# Patient Record
Sex: FEMALE | URBAN - METROPOLITAN AREA
[De-identification: names, ages, dates, MRNs, and addresses within clinical notes are randomized per-mention and may not be internally consistent; named-entity substitution may affect disease eponyms.]

---

## 2021-06-14 ENCOUNTER — OFFICE VISIT (OUTPATIENT)
Dept: URBAN - METROPOLITAN AREA CLINIC 35 | Facility: CLINIC | Age: 56
End: 2021-06-14

## 2021-06-14 ENCOUNTER — DASHBOARD ENCOUNTERS (OUTPATIENT)
Age: 56
End: 2021-06-14

## 2021-06-14 VITALS
SYSTOLIC BLOOD PRESSURE: 128 MMHG | BODY MASS INDEX: 32.89 KG/M2 | TEMPERATURE: 97.6 F | HEART RATE: 86 BPM | OXYGEN SATURATION: 97 % | WEIGHT: 217 LBS | HEIGHT: 68 IN | DIASTOLIC BLOOD PRESSURE: 80 MMHG

## 2021-06-14 RX ORDER — SODIUM SULFATE, MAGNESIUM SULFATE, AND POTASSIUM CHLORIDE 17.75; 2.7; 2.25 G/1; G/1; G/1
12 TABLETS AS DIRECTED TABLET ORAL
Qty: 1 KIT | Refills: 0 | OUTPATIENT
Start: 2021-06-14

## 2021-06-14 RX ORDER — PRAVASTATIN SODIUM 20 MG/1
1 TABLET TABLET ORAL ONCE A DAY
Qty: 30 | Status: ACTIVE | COMMUNITY

## 2021-06-14 NOTE — HPI-MIGRATED HPI
;     Colorectal Cancer Screening : 56 year old  female who presents today for a consultation for a colorectal cancer screening. She had her first colonoscopy approximately 10 years ago with Dr. Parham.  No polyps were found.  She currently admits to having 1 bowel movement per day sometimes she has one every 1 to 2 days. Stools are formed and soft without blood, mucus or melena.         No abdominal pain.        No weight loss        No CP, SOB or fever.        No blood thinners.        No sleep apnea. ;

## 2021-07-06 ENCOUNTER — TELEPHONE ENCOUNTER (OUTPATIENT)
Dept: URBAN - METROPOLITAN AREA CLINIC 35 | Facility: CLINIC | Age: 56
End: 2021-07-06

## 2021-07-07 ENCOUNTER — OFFICE VISIT (OUTPATIENT)
Dept: URBAN - METROPOLITAN AREA SURGERY CENTER 8 | Facility: SURGERY CENTER | Age: 56
End: 2021-07-07

## 2021-07-21 ENCOUNTER — TELEPHONE ENCOUNTER (OUTPATIENT)
Dept: URBAN - METROPOLITAN AREA CLINIC 35 | Facility: CLINIC | Age: 56
End: 2021-07-21

## 2021-07-22 ENCOUNTER — OFFICE VISIT (OUTPATIENT)
Dept: URBAN - NONMETROPOLITAN AREA CLINIC 5 | Facility: CLINIC | Age: 56
End: 2021-07-22